# Patient Record
Sex: FEMALE | Race: ASIAN | NOT HISPANIC OR LATINO | URBAN - METROPOLITAN AREA
[De-identification: names, ages, dates, MRNs, and addresses within clinical notes are randomized per-mention and may not be internally consistent; named-entity substitution may affect disease eponyms.]

---

## 2017-10-31 ENCOUNTER — OUTPATIENT (OUTPATIENT)
Dept: OUTPATIENT SERVICES | Facility: HOSPITAL | Age: 47
LOS: 1 days | End: 2017-10-31
Payer: COMMERCIAL

## 2017-10-31 VITALS
DIASTOLIC BLOOD PRESSURE: 95 MMHG | HEIGHT: 60 IN | TEMPERATURE: 98 F | HEART RATE: 81 BPM | WEIGHT: 143.96 LBS | SYSTOLIC BLOOD PRESSURE: 147 MMHG | RESPIRATION RATE: 16 BRPM

## 2017-10-31 DIAGNOSIS — N95.0 POSTMENOPAUSAL BLEEDING: ICD-10-CM

## 2017-10-31 DIAGNOSIS — Z01.818 ENCOUNTER FOR OTHER PREPROCEDURAL EXAMINATION: ICD-10-CM

## 2017-10-31 DIAGNOSIS — E11.9 TYPE 2 DIABETES MELLITUS WITHOUT COMPLICATIONS: ICD-10-CM

## 2017-10-31 LAB
ANION GAP SERPL CALC-SCNC: 7 MMOL/L — SIGNIFICANT CHANGE UP (ref 5–17)
BUN SERPL-MCNC: 12 MG/DL — SIGNIFICANT CHANGE UP (ref 7–23)
CALCIUM SERPL-MCNC: 8.6 MG/DL — SIGNIFICANT CHANGE UP (ref 8.5–10.1)
CHLORIDE SERPL-SCNC: 104 MMOL/L — SIGNIFICANT CHANGE UP (ref 96–108)
CO2 SERPL-SCNC: 28 MMOL/L — SIGNIFICANT CHANGE UP (ref 22–31)
CREAT SERPL-MCNC: 0.51 MG/DL — SIGNIFICANT CHANGE UP (ref 0.5–1.3)
GLUCOSE SERPL-MCNC: 266 MG/DL — HIGH (ref 70–99)
HBA1C BLD-MCNC: 8.6 % — HIGH (ref 4–5.6)
HCG SERPL-ACNC: 6 MIU/ML — SIGNIFICANT CHANGE UP
HCT VFR BLD CALC: 40.7 % — SIGNIFICANT CHANGE UP (ref 34.5–45)
HGB BLD-MCNC: 12.6 G/DL — SIGNIFICANT CHANGE UP (ref 11.5–15.5)
MCHC RBC-ENTMCNC: 26.6 PG — LOW (ref 27–34)
MCHC RBC-ENTMCNC: 31.1 GM/DL — LOW (ref 32–36)
MCV RBC AUTO: 85.8 FL — SIGNIFICANT CHANGE UP (ref 80–100)
PLATELET # BLD AUTO: 261 K/UL — SIGNIFICANT CHANGE UP (ref 150–400)
POTASSIUM SERPL-MCNC: 3.8 MMOL/L — SIGNIFICANT CHANGE UP (ref 3.5–5.3)
POTASSIUM SERPL-SCNC: 3.8 MMOL/L — SIGNIFICANT CHANGE UP (ref 3.5–5.3)
RBC # BLD: 4.75 M/UL — SIGNIFICANT CHANGE UP (ref 3.8–5.2)
RBC # FLD: 13.3 % — SIGNIFICANT CHANGE UP (ref 10.3–14.5)
SODIUM SERPL-SCNC: 139 MMOL/L — SIGNIFICANT CHANGE UP (ref 135–145)
WBC # BLD: 6.9 K/UL — SIGNIFICANT CHANGE UP (ref 3.8–10.5)
WBC # FLD AUTO: 6.9 K/UL — SIGNIFICANT CHANGE UP (ref 3.8–10.5)

## 2017-10-31 PROCEDURE — 83036 HEMOGLOBIN GLYCOSYLATED A1C: CPT

## 2017-10-31 PROCEDURE — 86850 RBC ANTIBODY SCREEN: CPT

## 2017-10-31 PROCEDURE — G0463: CPT

## 2017-10-31 PROCEDURE — 80048 BASIC METABOLIC PNL TOTAL CA: CPT

## 2017-10-31 PROCEDURE — 86900 BLOOD TYPING SEROLOGIC ABO: CPT

## 2017-10-31 PROCEDURE — 85027 COMPLETE CBC AUTOMATED: CPT

## 2017-10-31 PROCEDURE — 84702 CHORIONIC GONADOTROPIN TEST: CPT

## 2017-10-31 PROCEDURE — 86901 BLOOD TYPING SEROLOGIC RH(D): CPT

## 2017-10-31 NOTE — H&P PST ADULT - FAMILY HISTORY
Father  Still living? No  Family history of other heart disease, Age at diagnosis: Age Unknown  Family history of diabetes mellitus (DM), Age at diagnosis: Age Unknown     Mother  Still living? Yes, Estimated age: Age Unknown  Family history of diabetes mellitus (DM), Age at diagnosis: Age Unknown  Family history of CABG, Age at diagnosis: Age Unknown

## 2017-10-31 NOTE — H&P PST ADULT - PMH
Postmenopausal bleeding    Type 2 diabetes mellitus without complication, without long-term current use of insulin  "Borderline" Dx 2 years ago. Was previously on Metform but stopped it after having GI issues. PCP aware. Last A1c unknown.

## 2017-10-31 NOTE — H&P PST ADULT - HISTORY OF PRESENT ILLNESS
46 yo female with T2DM,  LMP  presents to PST scheduled for a D&C hysteroscopy on  with Dr. Sena. Reports PMB "staining" in 2017. Unable to do exam in office bc of tight cervix. Denies pain and discomfort. H/O stillborn

## 2017-10-31 NOTE — H&P PST ADULT - PROBLEM SELECTOR PLAN 3
"Borderline" Dx 2 years ago. Was previously on Metformin but stopped it after having GI issues. PCP aware. Last A1c unknown. A1c pending. Advised that she would need endo clearance, if results are 9 or above. Verbalized understanding.

## 2017-10-31 NOTE — H&P PST ADULT - PROBLEM SELECTOR PLAN 2
Labs- CBC, HCG and T&S  MC with Dr. Lopez   Pre op instructions reviewed and given. Instructed to avoid NSAIDs and OTC supplements. Verbalized understanding.

## 2018-08-17 PROBLEM — Z00.00 ENCOUNTER FOR PREVENTIVE HEALTH EXAMINATION: Status: ACTIVE | Noted: 2018-08-17

## 2018-08-17 PROBLEM — N95.0 POSTMENOPAUSAL BLEEDING: Chronic | Status: ACTIVE | Noted: 2017-10-31

## 2018-08-17 PROBLEM — E11.9 TYPE 2 DIABETES MELLITUS WITHOUT COMPLICATIONS: Chronic | Status: ACTIVE | Noted: 2017-10-31

## 2018-08-24 ENCOUNTER — TRANSCRIPTION ENCOUNTER (OUTPATIENT)
Age: 48
End: 2018-08-24

## 2018-08-25 ENCOUNTER — RESULT REVIEW (OUTPATIENT)
Age: 48
End: 2018-08-25

## 2018-08-25 ENCOUNTER — INPATIENT (INPATIENT)
Facility: HOSPITAL | Age: 48
LOS: 0 days | Discharge: HOME | End: 2018-08-26
Attending: SURGERY | Admitting: SURGERY
Payer: COMMERCIAL

## 2018-08-25 VITALS
OXYGEN SATURATION: 100 % | HEART RATE: 86 BPM | SYSTOLIC BLOOD PRESSURE: 145 MMHG | TEMPERATURE: 100 F | RESPIRATION RATE: 18 BRPM | DIASTOLIC BLOOD PRESSURE: 68 MMHG

## 2018-08-25 DIAGNOSIS — K81.9 CHOLECYSTITIS, UNSPECIFIED: ICD-10-CM

## 2018-08-25 LAB
ALBUMIN SERPL ELPH-MCNC: 4.3 G/DL — SIGNIFICANT CHANGE UP (ref 3.3–5)
ALP SERPL-CCNC: 129 U/L — HIGH (ref 40–120)
ALT FLD-CCNC: 48 U/L — HIGH (ref 4–33)
APPEARANCE UR: CLEAR — SIGNIFICANT CHANGE UP
APTT BLD: 32 SEC — SIGNIFICANT CHANGE UP (ref 27.5–37.4)
AST SERPL-CCNC: 33 U/L — HIGH (ref 4–32)
BASOPHILS # BLD AUTO: 0.05 K/UL — SIGNIFICANT CHANGE UP (ref 0–0.2)
BASOPHILS NFR BLD AUTO: 0.5 % — SIGNIFICANT CHANGE UP (ref 0–2)
BILIRUB SERPL-MCNC: 0.4 MG/DL — SIGNIFICANT CHANGE UP (ref 0.2–1.2)
BILIRUB UR-MCNC: NEGATIVE — SIGNIFICANT CHANGE UP
BLD GP AB SCN SERPL QL: NEGATIVE — SIGNIFICANT CHANGE UP
BLOOD UR QL VISUAL: NEGATIVE — SIGNIFICANT CHANGE UP
BUN SERPL-MCNC: 10 MG/DL — SIGNIFICANT CHANGE UP (ref 7–23)
CALCIUM SERPL-MCNC: 9.3 MG/DL — SIGNIFICANT CHANGE UP (ref 8.4–10.5)
CHLORIDE SERPL-SCNC: 100 MMOL/L — SIGNIFICANT CHANGE UP (ref 98–107)
CO2 SERPL-SCNC: 24 MMOL/L — SIGNIFICANT CHANGE UP (ref 22–31)
COLOR SPEC: YELLOW — SIGNIFICANT CHANGE UP
CREAT SERPL-MCNC: 0.54 MG/DL — SIGNIFICANT CHANGE UP (ref 0.5–1.3)
EOSINOPHIL # BLD AUTO: 0.12 K/UL — SIGNIFICANT CHANGE UP (ref 0–0.5)
EOSINOPHIL NFR BLD AUTO: 1.2 % — SIGNIFICANT CHANGE UP (ref 0–6)
GLUCOSE SERPL-MCNC: 309 MG/DL — HIGH (ref 70–99)
GLUCOSE UR-MCNC: >1000 — HIGH
HCG UR-SCNC: NEGATIVE — SIGNIFICANT CHANGE UP
HCT VFR BLD CALC: 41 % — SIGNIFICANT CHANGE UP (ref 34.5–45)
HGB BLD-MCNC: 13.2 G/DL — SIGNIFICANT CHANGE UP (ref 11.5–15.5)
IMM GRANULOCYTES # BLD AUTO: 0.04 # — SIGNIFICANT CHANGE UP
IMM GRANULOCYTES NFR BLD AUTO: 0.4 % — SIGNIFICANT CHANGE UP (ref 0–1.5)
INR BLD: 1.01 — SIGNIFICANT CHANGE UP (ref 0.88–1.17)
KETONES UR-MCNC: SIGNIFICANT CHANGE UP
LEUKOCYTE ESTERASE UR-ACNC: NEGATIVE — SIGNIFICANT CHANGE UP
LIDOCAIN IGE QN: 24.1 U/L — SIGNIFICANT CHANGE UP (ref 7–60)
LYMPHOCYTES # BLD AUTO: 2.39 K/UL — SIGNIFICANT CHANGE UP (ref 1–3.3)
LYMPHOCYTES # BLD AUTO: 24.9 % — SIGNIFICANT CHANGE UP (ref 13–44)
MCHC RBC-ENTMCNC: 26.8 PG — LOW (ref 27–34)
MCHC RBC-ENTMCNC: 32.2 % — SIGNIFICANT CHANGE UP (ref 32–36)
MCV RBC AUTO: 83.2 FL — SIGNIFICANT CHANGE UP (ref 80–100)
MONOCYTES # BLD AUTO: 0.62 K/UL — SIGNIFICANT CHANGE UP (ref 0–0.9)
MONOCYTES NFR BLD AUTO: 6.5 % — SIGNIFICANT CHANGE UP (ref 2–14)
NEUTROPHILS # BLD AUTO: 6.39 K/UL — SIGNIFICANT CHANGE UP (ref 1.8–7.4)
NEUTROPHILS NFR BLD AUTO: 66.5 % — SIGNIFICANT CHANGE UP (ref 43–77)
NITRITE UR-MCNC: NEGATIVE — SIGNIFICANT CHANGE UP
NRBC # FLD: 0 — SIGNIFICANT CHANGE UP
PH UR: 6.5 — SIGNIFICANT CHANGE UP (ref 5–8)
PLATELET # BLD AUTO: 254 K/UL — SIGNIFICANT CHANGE UP (ref 150–400)
PMV BLD: 10 FL — SIGNIFICANT CHANGE UP (ref 7–13)
POTASSIUM SERPL-MCNC: 4 MMOL/L — SIGNIFICANT CHANGE UP (ref 3.5–5.3)
POTASSIUM SERPL-SCNC: 4 MMOL/L — SIGNIFICANT CHANGE UP (ref 3.5–5.3)
PROT SERPL-MCNC: 7.4 G/DL — SIGNIFICANT CHANGE UP (ref 6–8.3)
PROT UR-MCNC: SIGNIFICANT CHANGE UP
PROTHROM AB SERPL-ACNC: 11.6 SEC — SIGNIFICANT CHANGE UP (ref 9.8–13.1)
RBC # BLD: 4.93 M/UL — SIGNIFICANT CHANGE UP (ref 3.8–5.2)
RBC # FLD: 13.2 % — SIGNIFICANT CHANGE UP (ref 10.3–14.5)
RH IG SCN BLD-IMP: POSITIVE — SIGNIFICANT CHANGE UP
SODIUM SERPL-SCNC: 139 MMOL/L — SIGNIFICANT CHANGE UP (ref 135–145)
SP GR SPEC: 1.03 — SIGNIFICANT CHANGE UP (ref 1–1.04)
SP GR UR: 1.03 — HIGH (ref 1–1.03)
UROBILINOGEN FLD QL: NORMAL — SIGNIFICANT CHANGE UP
WBC # BLD: 9.61 K/UL — SIGNIFICANT CHANGE UP (ref 3.8–10.5)
WBC # FLD AUTO: 9.61 K/UL — SIGNIFICANT CHANGE UP (ref 3.8–10.5)

## 2018-08-25 PROCEDURE — 88304 TISSUE EXAM BY PATHOLOGIST: CPT | Mod: 26

## 2018-08-25 PROCEDURE — 47562 LAPAROSCOPIC CHOLECYSTECTOMY: CPT | Mod: GC,22

## 2018-08-25 PROCEDURE — 76705 ECHO EXAM OF ABDOMEN: CPT | Mod: 26

## 2018-08-25 RX ORDER — KETOROLAC TROMETHAMINE 30 MG/ML
30 SYRINGE (ML) INJECTION EVERY 6 HOURS
Qty: 0 | Refills: 0 | Status: DISCONTINUED | OUTPATIENT
Start: 2018-08-25 | End: 2018-08-26

## 2018-08-25 RX ORDER — OXYCODONE HYDROCHLORIDE 5 MG/1
10 TABLET ORAL EVERY 4 HOURS
Qty: 0 | Refills: 0 | Status: DISCONTINUED | OUTPATIENT
Start: 2018-08-25 | End: 2018-08-26

## 2018-08-25 RX ORDER — ONDANSETRON 8 MG/1
4 TABLET, FILM COATED ORAL EVERY 6 HOURS
Qty: 0 | Refills: 0 | Status: DISCONTINUED | OUTPATIENT
Start: 2018-08-25 | End: 2018-08-26

## 2018-08-25 RX ORDER — OXYCODONE HYDROCHLORIDE 5 MG/1
5 TABLET ORAL EVERY 6 HOURS
Qty: 0 | Refills: 0 | Status: DISCONTINUED | OUTPATIENT
Start: 2018-08-25 | End: 2018-08-26

## 2018-08-25 RX ORDER — SODIUM CHLORIDE 9 MG/ML
1000 INJECTION INTRAMUSCULAR; INTRAVENOUS; SUBCUTANEOUS ONCE
Qty: 0 | Refills: 0 | Status: COMPLETED | OUTPATIENT
Start: 2018-08-25 | End: 2018-08-25

## 2018-08-25 RX ORDER — ACETAMINOPHEN 500 MG
650 TABLET ORAL EVERY 6 HOURS
Qty: 0 | Refills: 0 | Status: DISCONTINUED | OUTPATIENT
Start: 2018-08-25 | End: 2018-08-26

## 2018-08-25 RX ORDER — ONDANSETRON 8 MG/1
4 TABLET, FILM COATED ORAL ONCE
Qty: 0 | Refills: 0 | Status: DISCONTINUED | OUTPATIENT
Start: 2018-08-25 | End: 2018-08-26

## 2018-08-25 RX ORDER — ACETAMINOPHEN 500 MG
975 TABLET ORAL ONCE
Qty: 0 | Refills: 0 | Status: COMPLETED | OUTPATIENT
Start: 2018-08-25 | End: 2018-08-25

## 2018-08-25 RX ORDER — HYDROMORPHONE HYDROCHLORIDE 2 MG/ML
0.5 INJECTION INTRAMUSCULAR; INTRAVENOUS; SUBCUTANEOUS
Qty: 0 | Refills: 0 | Status: DISCONTINUED | OUTPATIENT
Start: 2018-08-25 | End: 2018-08-26

## 2018-08-25 RX ORDER — SODIUM CHLORIDE 9 MG/ML
1000 INJECTION, SOLUTION INTRAVENOUS
Qty: 0 | Refills: 0 | Status: DISCONTINUED | OUTPATIENT
Start: 2018-08-25 | End: 2018-08-26

## 2018-08-25 RX ORDER — ENOXAPARIN SODIUM 100 MG/ML
40 INJECTION SUBCUTANEOUS EVERY 24 HOURS
Qty: 0 | Refills: 0 | Status: DISCONTINUED | OUTPATIENT
Start: 2018-08-25 | End: 2018-08-26

## 2018-08-25 RX ADMIN — SODIUM CHLORIDE 2000 MILLILITER(S): 9 INJECTION INTRAMUSCULAR; INTRAVENOUS; SUBCUTANEOUS at 11:48

## 2018-08-25 RX ADMIN — SODIUM CHLORIDE 75 MILLILITER(S): 9 INJECTION, SOLUTION INTRAVENOUS at 23:29

## 2018-08-25 RX ADMIN — Medication 650 MILLIGRAM(S): at 23:33

## 2018-08-25 RX ADMIN — Medication 975 MILLIGRAM(S): at 11:50

## 2018-08-25 NOTE — ED PROVIDER NOTE - PHYSICAL EXAMINATION
Physical Exam:   GEN: adult F who is in mild acute distress, AAOx3  HENT: NCAT, MMM, no stridor  EYES: PERRLA, EOMI  RESP: CTAB, no respiratory distress  CV: normal rate and regular rhythm, S1 S2  ABD: soft but with + TTP to the RUQ with + Hinojosa's  : negative for CVA tenderness  EXT: No edema, No bony deformity of extremities  SKIN: No skin breaks, skin color normal for race  NEURO: CN grossly intact, No focal motor or sensory deficits.   ~ Gadiel Loomis MD

## 2018-08-25 NOTE — H&P ADULT - HISTORY OF PRESENT ILLNESS
7 F w/ Hx of DM not on medications, who p/w RUQ and R mid abd pain that radiates to the R back, that pt started to feel 2 wks ago. Initially intermittent, but now constant, sharp in sensation for last 48 hours. Pt has been taking Alleve without significant improvement in her symptoms. Associated w/ nausea and sweats. Pt w/ U/S on 8/22 that showed cholelithiasis.     In ED, patient otherwise hemodynamically stable, without evidence of cholecystitis on ultrasound performed here only cholelithiasis. Given chronicity and acute change in pain, will admit to surgery for further management.

## 2018-08-25 NOTE — ED ADULT TRIAGE NOTE - CHIEF COMPLAINT QUOTE
pt c/o of rt sided mid area abd pain with nausea/ pt had sono that reveled gallstones pt has appt to seen dr owusu/  states pain worse

## 2018-08-25 NOTE — ED PROVIDER NOTE - MEDICAL DECISION MAKING DETAILS
Gadiel Loomis MD: RUQ abd pain w/ known gallstones, but now with constant pain and + Hinojosa's sign. Concerning for cholecystitis. Will get labs and U/S. Surgery consult.

## 2018-08-25 NOTE — BRIEF OPERATIVE NOTE - OPERATION/FINDINGS
Gallbladder was markedly distended and inflamed, and densely adhesed to surrounding tissues. The gallbladder was decompressed by needle aspiration, with return of light green, clear fluid. Extensive adhesiolysis performed. Vascular structure dissected free anterior and medial to the gallbladder wall, and it was noted to directly enter the gallbladder. This vessel was ligated with hemo-lock clips and divided sharply. When dissection of the gallbladder proved quite difficult (secondary to dense adhesions and bleeding from dissected tissues), dissection was attempted beginning at the fundus and progressing proximally, dividing the gallbladder from the liver bed. Multiple cholecystotomies were made during dissection. The body of the gallbladder was noted to continue proximally toward the sina hepatis.  At this point, the decision was made to perform a sub-total cholecystectomy. One of the already-existing cholecystotomies was enlarged, and the body was inspected, revealing several large gallstones impacted. These were removed from the body of the gallbladder and placed in an endo-catch bag. The gallbladder was then transected and removed in an endo-catch bag. The gallbladder remnant was ligated with a Vicryl endo-loop, a Prolene endo-loop, and finally suture-ligated with a Vicryl suture. A 10 fr Fabien-Frank drain was left in the liver bed.

## 2018-08-25 NOTE — ED PROVIDER NOTE - OBJECTIVE STATEMENT
Gadiel Loomis MD: 47 F w/ Hx of DM not on medications, who p/w RUQ and R mid abd pain that radiates to the R back, that pt started to feel 2 wks ago. Initially intermittent, but now constant, sharp in sensation. Pt has bene taking Alleve without significant improvement in her symptoms. Associated w/ nausea and sweats. Pt w/ U/S on 8/22 that showed cholelithiasis. Has appointment w/ surgeon Dr. Chambers on Tuesday. Pt has not had menses for last several years.

## 2018-08-25 NOTE — ED PROVIDER NOTE - NS ED ROS FT
CONST: no fevers, no chills  EYES: no redness, no vision changes   HENT: no hearing changes, no epistaxis  CV: no chest pain, no palpitations     RESP: no shortness of breath, no cough  ABD: + abdominal pain, no vomiting     : no dysuria, no hematuria   MSK: no muscle pain, no joint swelling     NEURO: no headache, no focal weakness or loss of sensation     SKIN:  no rash, no lacerations.   ~ Gadiel Loomis MD

## 2018-08-25 NOTE — H&P ADULT - ASSESSMENT
47 F with PMHx of DMII with worsening ab pain over past 24 hours, consistent with acute cholecystitis    - Admit to B team surgery, Dr. Fuchs  - Add on for lap erwin  - f/u stat pre-op labs   - NPO/ IVF  - DVT ppx  - Discussed with Dr. Shila Fortune PGY-2  B Team [id number]

## 2018-08-25 NOTE — ED PROVIDER NOTE - CARE PLAN
complains of pain/discomfort Principal Discharge DX:	Cholecystitis  Secondary Diagnosis:	Right upper quadrant abdominal pain

## 2018-08-25 NOTE — ED PROVIDER NOTE - PROGRESS NOTE DETAILS
Gadiel Loomis MD: accepted by surgery service as pt w/ clinical cholecystitis despite negative labs and U/S Gadiel Loomis MD: pt seen and evaluated by surgery resident; accepted by surgery service as pt w/ clinical cholecystitis despite negative labs and U/S; deferred decision for atbx to surgical service; plan to go to OR to remove gallbladder

## 2018-08-25 NOTE — BRIEF OPERATIVE NOTE - POST-OP DX
Acute cholecystitis due to biliary calculus  08/25/2018    Martinez Packer  Hydrops of gallbladder  08/25/2018    Martinez Packer

## 2018-08-25 NOTE — H&P ADULT - NSHPLABSRESULTS_GEN_ALL_CORE
13.2   9.61  )-----------( 254      ( 25 Aug 2018 11:50 )             41.0   08-25    139  |  100  |  10  ----------------------------<  309<H>  4.0   |  24  |  0.54    Ca    9.3      25 Aug 2018 11:50    TPro  7.4  /  Alb  4.3  /  TBili  0.4  /  DBili  x   /  AST  33<H>  /  ALT  48<H>  /  AlkPhos  129<H>  08-25  < from: US Abdomen Limited (08.25.18 @ 13:28) >    IMPRESSION:     Cholelithiasis without sonographic evidence for acute cholecystitis.    Diffusely increased echogenicity of the liver, suggestive of hepatic   steatosis.    < end of copied text >

## 2018-08-25 NOTE — ED ADULT NURSE NOTE - NSIMPLEMENTINTERV_GEN_ALL_ED
Implemented All Universal Safety Interventions:  [place] to call system. Call bell, personal items and telephone within reach. Instruct patient to call for assistance. Room bathroom lighting operational. Non-slip footwear when patient is off stretcher. Physically safe environment: no spills, clutter or unnecessary equipment. Stretcher in lowest position, wheels locked, appropriate side rails in place.

## 2018-08-25 NOTE — H&P ADULT - NSHPPHYSICALEXAM_GEN_ALL_CORE
Gen: middle aged woman, uncomfortable sitting upright in bed  Resp: breathing comfortably on RA  Cardiac: RRR on monitor  GI: soft, nondistended, markedly tender to palpation of RUQ . Positive murphys on exam. No rebound or guarding.

## 2018-08-26 ENCOUNTER — TRANSCRIPTION ENCOUNTER (OUTPATIENT)
Age: 48
End: 2018-08-26

## 2018-08-26 VITALS
DIASTOLIC BLOOD PRESSURE: 75 MMHG | OXYGEN SATURATION: 99 % | TEMPERATURE: 98 F | SYSTOLIC BLOOD PRESSURE: 130 MMHG | RESPIRATION RATE: 18 BRPM | HEART RATE: 59 BPM

## 2018-08-26 LAB
ALBUMIN SERPL ELPH-MCNC: 3.2 G/DL — LOW (ref 3.3–5)
ALP SERPL-CCNC: 92 U/L — SIGNIFICANT CHANGE UP (ref 40–120)
ALT FLD-CCNC: 64 U/L — HIGH (ref 4–33)
AST SERPL-CCNC: 47 U/L — HIGH (ref 4–32)
BASOPHILS # BLD AUTO: 0.01 K/UL — SIGNIFICANT CHANGE UP (ref 0–0.2)
BASOPHILS NFR BLD AUTO: 0.1 % — SIGNIFICANT CHANGE UP (ref 0–2)
BILIRUB SERPL-MCNC: 0.5 MG/DL — SIGNIFICANT CHANGE UP (ref 0.2–1.2)
BUN SERPL-MCNC: 6 MG/DL — LOW (ref 7–23)
CALCIUM SERPL-MCNC: 8.7 MG/DL — SIGNIFICANT CHANGE UP (ref 8.4–10.5)
CHLORIDE SERPL-SCNC: 101 MMOL/L — SIGNIFICANT CHANGE UP (ref 98–107)
CO2 SERPL-SCNC: 23 MMOL/L — SIGNIFICANT CHANGE UP (ref 22–31)
CREAT SERPL-MCNC: 0.5 MG/DL — SIGNIFICANT CHANGE UP (ref 0.5–1.3)
EOSINOPHIL # BLD AUTO: 0 K/UL — SIGNIFICANT CHANGE UP (ref 0–0.5)
EOSINOPHIL NFR BLD AUTO: 0 % — SIGNIFICANT CHANGE UP (ref 0–6)
GLUCOSE BLDC GLUCOMTR-MCNC: 280 MG/DL — HIGH (ref 70–99)
GLUCOSE SERPL-MCNC: 289 MG/DL — HIGH (ref 70–99)
HCT VFR BLD CALC: 36.2 % — SIGNIFICANT CHANGE UP (ref 34.5–45)
HGB BLD-MCNC: 11.5 G/DL — SIGNIFICANT CHANGE UP (ref 11.5–15.5)
IMM GRANULOCYTES # BLD AUTO: 0.04 # — SIGNIFICANT CHANGE UP
IMM GRANULOCYTES NFR BLD AUTO: 0.4 % — SIGNIFICANT CHANGE UP (ref 0–1.5)
LYMPHOCYTES # BLD AUTO: 1.1 K/UL — SIGNIFICANT CHANGE UP (ref 1–3.3)
LYMPHOCYTES # BLD AUTO: 10.7 % — LOW (ref 13–44)
MCHC RBC-ENTMCNC: 26.4 PG — LOW (ref 27–34)
MCHC RBC-ENTMCNC: 31.8 % — LOW (ref 32–36)
MCV RBC AUTO: 83 FL — SIGNIFICANT CHANGE UP (ref 80–100)
MONOCYTES # BLD AUTO: 0.48 K/UL — SIGNIFICANT CHANGE UP (ref 0–0.9)
MONOCYTES NFR BLD AUTO: 4.7 % — SIGNIFICANT CHANGE UP (ref 2–14)
NEUTROPHILS # BLD AUTO: 8.62 K/UL — HIGH (ref 1.8–7.4)
NEUTROPHILS NFR BLD AUTO: 84.1 % — HIGH (ref 43–77)
NRBC # FLD: 0 — SIGNIFICANT CHANGE UP
PLATELET # BLD AUTO: 194 K/UL — SIGNIFICANT CHANGE UP (ref 150–400)
PMV BLD: 10.5 FL — SIGNIFICANT CHANGE UP (ref 7–13)
POTASSIUM SERPL-MCNC: 4.1 MMOL/L — SIGNIFICANT CHANGE UP (ref 3.5–5.3)
POTASSIUM SERPL-SCNC: 4.1 MMOL/L — SIGNIFICANT CHANGE UP (ref 3.5–5.3)
PROT SERPL-MCNC: 6.2 G/DL — SIGNIFICANT CHANGE UP (ref 6–8.3)
RBC # BLD: 4.36 M/UL — SIGNIFICANT CHANGE UP (ref 3.8–5.2)
RBC # FLD: 13.5 % — SIGNIFICANT CHANGE UP (ref 10.3–14.5)
SODIUM SERPL-SCNC: 137 MMOL/L — SIGNIFICANT CHANGE UP (ref 135–145)
WBC # BLD: 10.25 K/UL — SIGNIFICANT CHANGE UP (ref 3.8–10.5)
WBC # FLD AUTO: 10.25 K/UL — SIGNIFICANT CHANGE UP (ref 3.8–10.5)

## 2018-08-26 RX ORDER — DEXTROSE 50 % IN WATER 50 %
15 SYRINGE (ML) INTRAVENOUS ONCE
Qty: 0 | Refills: 0 | Status: DISCONTINUED | OUTPATIENT
Start: 2018-08-26 | End: 2018-08-26

## 2018-08-26 RX ORDER — OXYCODONE HYDROCHLORIDE 5 MG/1
1 TABLET ORAL
Qty: 12 | Refills: 0 | OUTPATIENT
Start: 2018-08-26

## 2018-08-26 RX ORDER — DEXTROSE 50 % IN WATER 50 %
25 SYRINGE (ML) INTRAVENOUS ONCE
Qty: 0 | Refills: 0 | Status: DISCONTINUED | OUTPATIENT
Start: 2018-08-26 | End: 2018-08-26

## 2018-08-26 RX ORDER — IBUPROFEN 200 MG
2 TABLET ORAL
Qty: 30 | Refills: 0 | OUTPATIENT
Start: 2018-08-26 | End: 2018-08-29

## 2018-08-26 RX ORDER — SODIUM CHLORIDE 9 MG/ML
1000 INJECTION, SOLUTION INTRAVENOUS
Qty: 0 | Refills: 0 | Status: DISCONTINUED | OUTPATIENT
Start: 2018-08-26 | End: 2018-08-26

## 2018-08-26 RX ORDER — DEXTROSE 50 % IN WATER 50 %
12.5 SYRINGE (ML) INTRAVENOUS ONCE
Qty: 0 | Refills: 0 | Status: DISCONTINUED | OUTPATIENT
Start: 2018-08-26 | End: 2018-08-26

## 2018-08-26 RX ORDER — GLUCAGON INJECTION, SOLUTION 0.5 MG/.1ML
1 INJECTION, SOLUTION SUBCUTANEOUS ONCE
Qty: 0 | Refills: 0 | Status: DISCONTINUED | OUTPATIENT
Start: 2018-08-26 | End: 2018-08-26

## 2018-08-26 RX ORDER — ACETAMINOPHEN 500 MG
2 TABLET ORAL
Qty: 0 | Refills: 0 | COMMUNITY
Start: 2018-08-26

## 2018-08-26 RX ADMIN — Medication 650 MILLIGRAM(S): at 05:41

## 2018-08-26 RX ADMIN — Medication 650 MILLIGRAM(S): at 06:10

## 2018-08-26 RX ADMIN — Medication 30 MILLIGRAM(S): at 10:35

## 2018-08-26 RX ADMIN — Medication 30 MILLIGRAM(S): at 03:03

## 2018-08-26 RX ADMIN — Medication 30 MILLIGRAM(S): at 03:20

## 2018-08-26 RX ADMIN — Medication 650 MILLIGRAM(S): at 00:46

## 2018-08-26 RX ADMIN — Medication 650 MILLIGRAM(S): at 13:40

## 2018-08-26 RX ADMIN — ENOXAPARIN SODIUM 40 MILLIGRAM(S): 100 INJECTION SUBCUTANEOUS at 13:38

## 2018-08-26 RX ADMIN — Medication 30 MILLIGRAM(S): at 09:35

## 2018-08-26 RX ADMIN — Medication 650 MILLIGRAM(S): at 14:00

## 2018-08-26 NOTE — DISCHARGE NOTE ADULT - ADDITIONAL INSTRUCTIONS
WOUND CARE:    BATHING: Please do not submerge wound underwater. You may shower and/or sponge bathe.  ACTIVITY: No heavy lifting or straining. Otherwise, you may return to your usual level of physical activity. If you are taking narcotic pain medication (such as Percocet) DO NOT drive a car, operate machinery or make important decisions.  DIET: regular consistent carbohydrate diet  NOTIFY YOUR SURGEON IF: You have any bleeding that does not stop, any pus draining from your wound(s), any fever (over 100.4 F) or chills, persistent nausea/vomiting, persistent diarrhea, or if your pain is not controlled on your discharge pain medications.  FOLLOW-UP: Please follow up with your primary care physician in one week regarding your hospitalization    Follow-up in 1-2 weeks with Dr. Mendez/Te/Gabi (

## 2018-08-26 NOTE — DISCHARGE NOTE ADULT - PLAN OF CARE
cholecystectomy to reduce pain and infection lap partial cholecystectomy was performed due to severe gallbladder inflammation and adhessions.  discharging with a dyana drain in place.

## 2018-08-26 NOTE — CHART NOTE - NSCHARTNOTEFT_GEN_A_CORE
POST-OPERATIVE NOTE    Subjective:  Patient is s/p lap partial cholecystectomy . Tolerating CLD, denies nausea or vomiting. Pain well controlled. Recovering appropriately.     Vital Signs Last 24 Hrs  T(C): 36.4 (26 Aug 2018 01:20), Max: 37.6 (25 Aug 2018 10:56)  T(F): 97.5 (26 Aug 2018 01:20), Max: 99.7 (25 Aug 2018 10:56)  HR: 60 (26 Aug 2018 01:20) (56 - 86)  BP: 134/78 (26 Aug 2018 01:20) (115/74 - 160/80)  BP(mean): --  RR: 18 (26 Aug 2018 01:20) (16 - 23)  SpO2: 100% (26 Aug 2018 01:20) (95% - 100%)  I&O's Detail    25 Aug 2018 07:01  -  26 Aug 2018 01:55  --------------------------------------------------------  IN:    lactated ringers.: 150 mL    Oral Fluid: 125 mL  Total IN: 275 mL    OUT:    Bulb: 30 mL    Voided: 700 mL  Total OUT: 730 mL    Total NET: -455 mL        enoxaparin Injectable 40    PAST MEDICAL & SURGICAL HISTORY:  Type 2 diabetes mellitus without complication, without long-term current use of insulin: &quot;Borderline&quot; Dx 2 years ago. Was previously on Metform but stopped it after having GI issues. PCP aware. Last A1c unknown.  Postmenopausal bleeding   (normal spontaneous vaginal delivery): x 2        Physical Exam:  General: NAD, resting comfortably in bed  Pulmonary: Nonlabored breathing, no respiratory distress  Cardiovascular: NSR  Abdominal: soft, ND, mild TTP along incisions, incisions clean dry and intact   Extremities: [facility]      LABS:                        13.2   9.61  )-----------( 254      ( 25 Aug 2018 11:50 )             41.0     08    139  |  100  |  10  ----------------------------<  309<H>  4.0   |  24  |  0.54    Ca    9.3      25 Aug 2018 11:50    TPro  7.4  /  Alb  4.3  /  TBili  0.4  /  DBili  x   /  AST  33<H>  /  ALT  48<H>  /  AlkPhos  129<H>  08    PT/INR - ( 25 Aug 2018 11:50 )   PT: 11.6 SEC;   INR: 1.01          PTT - ( 25 Aug 2018 11:50 )  PTT:32.0 SEC  CAPILLARY BLOOD GLUCOSE      POCT Blood Glucose.: 165 mg/dL (25 Aug 2018 16:19)      Radiology and Additional Studies:    Assessment:  The patient is a 47y Female who is now several hours post-op from a lap partial cholecystectomy     Plan:  - Pain control as needed  - DVT ppx  - OOB and ambulating as tolerated  - F/u AM labs  - advance diet as tolerated     Tuan Barnes, PGY-1  MU Team A Surgery [id number]

## 2018-08-26 NOTE — DISCHARGE NOTE ADULT - MEDICATION SUMMARY - MEDICATIONS TO TAKE
I will START or STAY ON the medications listed below when I get home from the hospital:    acetaminophen 325 mg oral tablet  -- 2 tab(s) by mouth every 6 hours  -- Indication: For mild to moderate pain    oxyCODONE 5 mg oral tablet  -- 1 tab(s) by mouth every 6 hours, As Needed -Mild Pain (1 - 3) MDD:4  -- Indication: For severe pain    Multiple Vitamins oral tablet  -- 1 tab(s) by mouth once a day  -- Indication: For home med I will START or STAY ON the medications listed below when I get home from the hospital:    acetaminophen 325 mg oral tablet  -- 2 tab(s) by mouth every 6 hours  -- Indication: For mild to moderate pain    oxyCODONE 5 mg oral tablet  -- 1 tab(s) by mouth every 6 hours, As Needed -Mild Pain (1 - 3) MDD:4  -- Indication: For severe pain    Advil 200 mg oral tablet  -- 2 tab(s) by mouth every 6 hours   -- Do not take this drug if you are pregnant.  It is very important that you take or use this exactly as directed.  Do not skip doses or discontinue unless directed by your doctor.  May cause drowsiness or dizziness.  Obtain medical advice before taking any non-prescription drugs as some may affect the action of this medication.  Take with food or milk.    -- Indication: For mild to moderate pain    Multiple Vitamins oral tablet  -- 1 tab(s) by mouth once a day  -- Indication: For home med

## 2018-08-26 NOTE — DISCHARGE NOTE ADULT - CARE PLAN
Principal Discharge DX:	Cholecystitis  Goal:	cholecystectomy to reduce pain and infection  Assessment and plan of treatment:	lap partial cholecystectomy was performed due to severe gallbladder inflammation and adhessions.  discharging with a dyana drain in place.

## 2018-08-26 NOTE — DISCHARGE NOTE ADULT - CARE PROVIDER_API CALL
Cristi Fuchs (MD), Surgery  [address] [address] [place] [address]  [place] [address]  Phone: [phone]  Fax: [phone]

## 2018-08-26 NOTE — DISCHARGE NOTE ADULT - HOSPITAL COURSE
47 F w/ Hx of DM not on medications, who p/w symptoms of acute cholecystitis on 8/25/18, now s/p laparoscopic partial cholecystitis (8/25/18). Pt recovered appropriately, was transferred to floor for overnight supervision, and was discharged in stable condition on 8/26/18 with RUDDY drain in place.    Please follow-up with Dr. Fuchs in 1-2wks.  RUDDY will be removed during follow-up office visit.

## 2018-08-26 NOTE — PROGRESS NOTE ADULT - SUBJECTIVE AND OBJECTIVE BOX
INTERVAL CHANGES/SUBJECTIVE: Pt seen + examined  no acute events overnight  pt admitted to surgical floor, s/p lap partial erwin   ---------------------------------------------------------------------------------------------   VITALS  T(C): 36.8 (08-26-18 @ 10:45), Max: 37 (08-25-18 @ 21:00)  HR: 52 (08-26-18 @ 10:45) (52 - 84)  BP: 122/80 (08-26-18 @ 10:45) (115/74 - 160/80)  RR: 18 (08-26-18 @ 10:45) (16 - 23)  SpO2: 100% (08-26-18 @ 10:45) (95% - 100%)  CAPILLARY BLOOD GLUCOSE      POCT Blood Glucose.: 280 mg/dL (26 Aug 2018 12:20)  POCT Blood Glucose.: 165 mg/dL (25 Aug 2018 16:19)      Is/Os    08-25 @ 07:01  -  08-26 @ 07:00  --------------------------------------------------------  IN:    lactated ringers.: 525 mL    Oral Fluid: 325 mL  Total IN: 850 mL    OUT:    Bulb: 40 mL    Voided: 1450 mL  Total OUT: 1490 mL    Total NET: -640 mL      08-26 @ 07:01  -  08-26 @ 12:25  --------------------------------------------------------  IN:    lactated ringers.: 150 mL  Total IN: 150 mL    OUT:  Total OUT: 0 mL    Total NET: 150 mL          ---------------------------------------------------------------------------------------------   PHYSICAL EXAM: ***  General: NAD, Lying in bed comfortably  Neuro: alert, oriented x3  HEENT: NC/AT, EOMI  Resp: Good effort  GI/Abd: Soft, appropriately tender, ND, c/d/i incisions      ---------------------------------------------------------------------------------------------   MEDICATIONS (STANDING): acetaminophen   Tablet. 650 milliGRAM(s) Oral every 6 hours  dextrose 50% Injectable 12.5 Gram(s) IV Push once  dextrose 50% Injectable 25 Gram(s) IV Push once  dextrose 50% Injectable 25 Gram(s) IV Push once  enoxaparin Injectable 40 milliGRAM(s) SubCutaneous every 24 hours  ketorolac   Injectable 30 milliGRAM(s) IV Push every 6 hours    MEDICATIONS (PRN):dextrose 40% Gel 15 Gram(s) Oral once PRN Blood Glucose LESS THAN 70 milliGRAM(s)/deciliter  glucagon  Injectable 1 milliGRAM(s) IntraMuscular once PRN Glucose LESS THAN 70 milligrams/deciliter  ondansetron Injectable 4 milliGRAM(s) IV Push every 6 hours PRN Nausea  oxyCODONE    IR 10 milliGRAM(s) Oral every 4 hours PRN Moderate Pain (4 - 6)  oxyCODONE    IR 5 milliGRAM(s) Oral every 6 hours PRN Mild Pain (1 - 3)      ---------------------------------------------------------------------------------------------   LABS  CBC (08-26 @ 07:00)                              11.5                           10.25   )----------------(  194        84.1<H>% Neutrophils, 10.7<L>% Lymphocytes, ANC: 8.62<H>                              36.2    CBC (08-25 @ 11:50)                              13.2                           9.61    )----------------(  254        66.5  % Neutrophils, 24.9  % Lymphocytes, ANC: 6.39                                41.0      BMP (08-26 @ 07:00)             137     |  101     |  6<L>  		Ca++ --      Ca 8.7                ---------------------------------( 289<H>		Mg --                 4.1     |  23      |  0.50  			Ph --      BMP (08-25 @ 11:50)             139     |  100     |  10    		Ca++ --      Ca 9.3                ---------------------------------( 309<H>		Mg --                 4.0     |  24      |  0.54  			Ph --        LFTs (08-26 @ 07:00)      TPro 6.2 / Alb 3.2<L> / TBili 0.5 / DBili -- / AST 47<H> / ALT 64<H> / AlkPhos 92  LFTs (08-25 @ 11:50)      TPro 7.4 / Alb 4.3 / TBili 0.4 / DBili -- / AST 33<H> / ALT 48<H> / AlkPhos 129<H>    Coags (08-25 @ 11:50)  aPTT 32.0 / INR 1.01 / PT 11.6    --------------------------------------------------------------------------------------------       ASSESSMENT  47y female s/p lap partial erwin, recovering well on floor    PLAN  - regular diet  - dyana teaching  - pain control  - oob, ambulate as appropriate  - vte ppx  - d/c home if doing well after lunch

## 2018-08-26 NOTE — DISCHARGE NOTE ADULT - PATIENT PORTAL LINK FT
You can access the [facility] Patient Portal, offered by [facility], by registering with the following website: [email or web address]

## 2018-08-28 ENCOUNTER — APPOINTMENT (OUTPATIENT)
Dept: SURGERY | Facility: CLINIC | Age: 48
End: 2018-08-28
Payer: COMMERCIAL

## 2018-08-28 ENCOUNTER — APPOINTMENT (OUTPATIENT)
Dept: GASTROENTEROLOGY | Facility: CLINIC | Age: 48
End: 2018-08-28

## 2018-08-28 VITALS
HEART RATE: 70 BPM | DIASTOLIC BLOOD PRESSURE: 76 MMHG | SYSTOLIC BLOOD PRESSURE: 110 MMHG | BODY MASS INDEX: 29.45 KG/M2 | RESPIRATION RATE: 15 BRPM | WEIGHT: 150 LBS | HEIGHT: 60 IN

## 2018-08-28 DIAGNOSIS — Z86.39 PERSONAL HISTORY OF OTHER ENDOCRINE, NUTRITIONAL AND METABOLIC DISEASE: ICD-10-CM

## 2018-08-28 LAB — SURGICAL PATHOLOGY STUDY: SIGNIFICANT CHANGE UP

## 2018-08-28 PROCEDURE — 99024 POSTOP FOLLOW-UP VISIT: CPT

## 2018-10-15 NOTE — BRIEF OPERATIVE NOTE - ANTIBIOTIC PROTOCOL
Post-Anesthesia Evaluation and Assessment Patient: Thad Huizar MRN: [id number]  SSN: [id number] YOB: 1953  Age: 76 y.o. Sex: female Cardiovascular Function/Vital Signs Visit Vitals  /75  Pulse (!) 0  
 Resp 13  Ht 5' 3\" (1.6 m)  Wt 51.7 kg (114 lb)  SpO2 100%  Breastfeeding No  
 BMI 20.19 kg/m2 Patient is status post MAC anesthesia for Procedure(s): 
COLONOSCOPY 
ENDOSCOPIC POLYPECTOMY RESOLUTION CLIP. Nausea/Vomiting: None Postoperative hydration reviewed and adequate. Pain: 
Pain Scale 1: Numeric (0 - 10) (10/11/18 1521) Pain Intensity 1: 0 (10/11/18 1521) Managed Neurological Status: At baseline Mental Status and Level of Consciousness: Arousable Pulmonary Status:  
O2 Device: Room air (10/11/18 1521) Adequate oxygenation and airway patent Complications related to anesthesia: None Post-anesthesia assessment completed. No concerns Signed By: Arleth Phelps MD   
 October 15, 2018
Followed protocol

## 2019-08-23 ENCOUNTER — TRANSCRIPTION ENCOUNTER (OUTPATIENT)
Age: 49
End: 2019-08-23

## 2021-02-12 NOTE — H&P PST ADULT - PROBLEM/PLAN-2
DISPLAY PLAN FREE TEXT Doxycycline Pregnancy And Lactation Text: This medication is Pregnancy Category D and not consider safe during pregnancy. It is also excreted in breast milk but is considered safe for shorter treatment courses.

## 2022-08-27 NOTE — BRIEF OPERATIVE NOTE - PROCEDURE
well developed, well nourished , in no acute distress , ambulating without difficulty , normal communication ability <<-----Click on this checkbox to enter Procedure Laparoscopic cholecystectomy, partial  08/25/2018    Active  [id number]

## 2022-10-28 NOTE — ED ADULT TRIAGE NOTE - ESI TRIAGE ACUITY LEVEL, MLM
3 Cantharidin Counseling: Calcipotriene Counseling:  I discussed with the patient the risks of calcipotriene including but not limited to erythema, scaling, itching, and irritation.